# Patient Record
Sex: FEMALE | Race: WHITE
[De-identification: names, ages, dates, MRNs, and addresses within clinical notes are randomized per-mention and may not be internally consistent; named-entity substitution may affect disease eponyms.]

---

## 2018-05-21 ENCOUNTER — HOSPITAL ENCOUNTER (OUTPATIENT)
Dept: HOSPITAL 68 - STS | Age: 58
End: 2018-05-21
Attending: SURGERY
Payer: COMMERCIAL

## 2018-05-21 VITALS — BODY MASS INDEX: 20.49 KG/M2 | HEIGHT: 64 IN | WEIGHT: 120 LBS

## 2018-05-21 DIAGNOSIS — G43.909: ICD-10-CM

## 2018-05-21 DIAGNOSIS — Z17.0: ICD-10-CM

## 2018-05-21 DIAGNOSIS — C50.411: Primary | ICD-10-CM

## 2018-05-21 DIAGNOSIS — F17.200: ICD-10-CM

## 2018-05-21 PROCEDURE — A9520 TC99 TILMANOCEPT DIAG 0.5MCI: HCPCS

## 2018-05-21 PROCEDURE — C9728 PLACE DEVICE/MARKER, NON PRO: HCPCS

## 2018-05-21 NOTE — NUCLEAR MEDICINE REPORT
EXAMINATION:
LYMPHOSCINTIGRAPHY
 
CLINICAL INFORMATION:
Right breast cancer. Ilfeld node localization.
 
COMPARISON:
None.
 
TECHNIQUE:
A total of 1.2 mCi technetium 99m Lymphoseek was injected in divided doses
around the right areola  by  JOSE Velázquez. Images of the right breast
and axilla in the anterior, CRUZ, and right lateral projections were obtained
with simultaneous visualization of the body silhouette using a cobalt flood
source, with the patient positioned between the flood source and the gamma
camera.
 
FINDINGS:
A sentinal node there is well visualized in the right axilla.
A few faint second echelon node  are  visualized in the right axilla.
 
IMPRESSION:
A sentinal node in the right axilla is well visualized.

## 2018-05-21 NOTE — OPERATIVE REPORT
Operative/Inv Procedure Report
Surgery Date: 05/21/18
Name of Procedure:
Right partial mastectomy with wire localization and sentinel lymph node biopsy
Pre-Operative Diagnosis:
Right breast cancer
Post-Operative Diagnosis:
Same
Estimated Blood Loss: less than 50ml
Surgeon/Assistant:
Marcelina Ordoñez MD
 
Anesthesia: laryngeal mask airway
Specimens:
Right lumpectomy, cranial margin, caudal margin, medial margin, lateral margin, 
skin margin, sentinel lymph node
 
Operative/Procedure Note
Note:
Patient was brought to the operating room on 05/21/2018 after preoperative wire 
localization and lymphoscintigraphy were performed and those films were 
reviewed.  She has a clinical stage I breast cancer.
She is brought to the operating room placed under anesthesia.  The right breast 
was prepped and draped in sterile fashion using ChloraPrep.  2 g of Ancef was 
given.  Localization of 1% lidocaine mixed half percent Marcaine was given.  3 
mL of methylene blue diluted with 2 mL of saline was injected in the 
retroareolar fashion.  The breast was approached first.  A circumareolar 
incision was made in the upper outer aspect.  The tissues dissected using 
electrocautery.  The wire was brought into the incision through the skin.  The 
air concern was grasped using an Allis clamp.  Approximately 3 cm of tissue was 
dissected past the edge of the tip the wire medially and posteriorly.  The 
specimen was removed and marked for orientation using margin map.  
Intraoperative x-ray confirmed the presence of the clip in the specimen.  
Dissection had gone down to the pectoralis muscle which was visible in the deep 
portion of the wound.  Additional shave margins were taken in the cranial, 
caudal, medial, lateral, and superficial positions.  Hemostasis was achieved 
using electrocautery.  A 2 x 2 BioSorb marker was then placed in the lumpectomy 
cavity.  This was fastened to the adjacent margins a lumpectomy using 3-0 Maxon 
sutures.  Breast tissue was used to cover the BioSorb marker using interrupted 
Vicryl sutures.  Skin was closed using a running Biosyn subcuticular stitch.
The axilla was then approached.  After administering local anesthesia similar 
fashion, a transverse incision was made in the lower axilla.  Subcutaneous 
tissue and clavipectoral fascia were dissected.  The axilla was explored.  There
was a single hot lymph node identified.  There were no other hot, blue, or 
palpable lymph nodes identified and the axilla.  Counts on the sentinel lymph 
node were to 24,000.  Hemostasis was achieved once again using electrocautery.  
Clavipectoral fascia was closed using Vicryl sutures and the skin incision was 
closed using running Biosyn subcutaneous color stitch.  Steri-Strips and sterile
dressings were applied and the patient was transferred to the recovery room in 
satisfactory condition having tolerated the procedure well.
Findings:
Patient was brought to the operating room on 05/21/2018 after preoperative wire 
localization and lymphoscintigraphy were performed and those films were 
reviewed.  She has a clinical stage I breast cancer.
She is brought to the operating room placed under anesthesia.  The right breast 
was prepped and draped in sterile fashion using ChloraPrep.  2 g of Ancef was 
given.  Localization of 1% lidocaine mixed half percent Marcaine was given.  3 
mL of methylene blue diluted with 2 mL of saline was injected in the 
retroareolar fashion.  The breast was approached first.  A circumareolar 
incision was made in the upper outer aspect.  The tissues dissected using 
electrocautery.  The wire was brought into the incision through the skin.  The 
air concern was grasped using an Allis clamp.  Approximately 3 cm of tissue was 
dissected past the edge of the tip the wire medially and posteriorly.  The 
specimen was removed and marked for orientation using margin map.  
Intraoperative x-ray confirmed the presence of the clip in the specimen.  
Dissection had gone down to the pectoralis muscle which was visible in the deep 
portion of the wound.  Additional shave margins were taken in the cranial, 
caudal, medial, lateral, and superficial positions.  Hemostasis was achieved 
using electrocautery.  A 2 x 2 BioSorb marker was then placed in the lumpectomy 
cavity.  This was fastened to the adjacent margins a lumpectomy using 3-0 Maxon 
sutures.  Breast tissue was used to cover the BioSorb marker using interrupted 
Vicryl sutures.  Skin was closed using a running Biosyn subcuticular stitch.
The axilla was then approached.  After administering local anesthesia similar 
fashion, a transverse incision was made in the lower axilla.  Subcutaneous 
tissue and clavipectoral fascia were dissected.  The axilla was explored.  There
was a single hot lymph node identified.  There were no other hot, blue, or 
palpable lymph nodes identified and the axilla.  Counts on the sentinel lymph 
node were to 24,000.  Hemostasis was achieved once again using electrocautery.  
Clavipectoral fascia was closed using Vicryl sutures and the skin incision was 
closed using running Biosyn subcutaneous color stitch.  Steri-Strips and sterile
dressings were applied and the patient was transferred to the recovery room in 
satisfactory condition having tolerated the procedure well.

## 2018-05-21 NOTE — MAMMOGRAPHY REPORT
PROCEDURE:
US GUIDANCE FOR BREAST PREOPERATIVE NEEDLE LOCALIZATION, RIGHT
MM POST NEEDLE LOCALIZATION SINGLE CC VIEW, RIGHT BREAST
SPECIMEN RADIOGRAPHY
 
CLINICAL INFORMATION:
Biopsy proved right-sided breast carcinoma at 11 o'clock. Preoperative needle
localization is requested.
 
COMPARISON:
Prior studies done 05/21/2018, 03/29/2018 and 04/05/2018.
TECHNIQUE AND FINDINGS:
The details of the procedure, as well as the risks, benefits, and
alternatives to the procedure were explained to the patient in detail and all
of her questions were answered, after which, written informed consent was
obtained.
 
Prior to the procedure, sonography revealed residual suspicious altered
echotexture at 11 o'clock, 8 cm from the nipple, measuring 0.5 cm at its
maximum dimension.
 
A timeout was performed, the lesion intended for needle localization was
targeted and the skin of the right breast was then prepped and draped in the
usual sterile fashion.
 
Using sonographic guidance, sterile technique and buffered 2% lidocaine
without epinephrine for local anesthesia, a 5 cm Kopan's needle was placed
within the biopsy proved malignancy at 11 o'clock, 8 cm from the nipple
within the right breast.
 
The patient tolerated the procedure well..
 
The single view mammogram further confirmed accurate placement of the wire
within the intended mass. No adjustment was needed. Previously placed tissue
marker was also identified adjacent to the wire.
 
There are scattered areas of fibroglandular density (ACR BI-RADS breast
composition Category b).
 
The worksheet was appropriately labeled and was sent to the OR with the
patient.
 
Subsequently, following excision of the mass, the specimen radiography was
performed which revealed target within the specimen with intact wire and the
previously placed tissue marker within the mass.
 
IMPRESSION:
1. Successful sonographic guided wire localization of the right breast biopsy
proved 11 o'clock solid mass.
2. Mammographic confirmation of accurate needle localization along with
appropriate marking for presurgical roadmap with the worksheet.
3. Final specimen radiograph confirming complete, adequate excision of the
mass and removal of the previously placed tissue marker and intact wire.
 
Due to technical difficulties in transmitting the specimen radiograph images
from the operating room into the PACS following excision, the findings could
not be directly reviewed with Dr. Ordoñez.
 
 
The histology report is pending.